# Patient Record
(demographics unavailable — no encounter records)

---

## 2024-10-18 NOTE — END OF VISIT
[FreeTextEntry3] :  All medical record entries made by my scribe were at my, Dr. Lester Arellano, direction and personally dictated by me. I have reviewed the chart and agree that the record accurately reflects my personal performance of the history, physical exam, assessment and plan. I have also personally directed, reviewed, and agreed with the chart.

## 2024-10-18 NOTE — PROCEDURE
[No] : not [NSR] : normal sinus rhythm [See Device Printout] : See device printout [Longevity: ___ months] : The estimated remaining battery life is [unfilled] months [Sensing Amplitude ___mv] : sensing amplitude was [unfilled] mv [None] : none [Programmed for Longevity] : output reprogrammed for improved battery longevity [de-identified] : 58bpm [de-identified] : Medtronic [de-identified] : LINQ 2 [de-identified] : [id number] [de-identified] : 5/3/2022 [de-identified] : 68 monitored AF episodes - longest lasting 14 hours on 08/26/24 AF burden - 7% On remote

## 2024-10-18 NOTE — PHYSICAL EXAM
[General Appearance - Well Developed] : well developed [Normal Appearance] : normal appearance [Well Groomed] : well groomed [General Appearance - Well Nourished] : well nourished [No Deformities] : no deformities [General Appearance - In No Acute Distress] : no acute distress [Heart Rate And Rhythm] : heart rate and rhythm were normal [Heart Sounds] : normal S1 and S2 [Murmurs] : no murmurs present [Exaggerated Use Of Accessory Muscles For Inspiration] : no accessory muscle use [Respiration, Rhythm And Depth] : normal respiratory rhythm and effort [Auscultation Breath Sounds / Voice Sounds] : lungs were clear to auscultation bilaterally [Clean] : clean [Dry] : dry [Well-Healed] : well-healed [Abdomen Soft] : soft [Abdomen Tenderness] : non-tender [Abdomen Mass (___ Cm)] : no abdominal mass palpated [Nail Clubbing] : no clubbing of the fingernails [Cyanosis, Localized] : no localized cyanosis [Petechial Hemorrhages (___cm)] : no petechial hemorrhages [] : no ischemic changes [Well Developed] : well developed [Well Nourished] : well nourished [No Acute Distress] : no acute distress [Normal Conjunctiva] : normal conjunctiva [Normal Venous Pressure] : normal venous pressure [No Carotid Bruit] : no carotid bruit [Normal S1, S2] : normal S1, S2 [No Murmur] : no murmur [No Rub] : no rub [No Gallop] : no gallop [Clear Lung Fields] : clear lung fields [Good Air Entry] : good air entry [No Respiratory Distress] : no respiratory distress  [Soft] : abdomen soft [Non Tender] : non-tender [No Masses/organomegaly] : no masses/organomegaly [Normal Bowel Sounds] : normal bowel sounds [Normal Gait] : normal gait [No Edema] : no edema [No Cyanosis] : no cyanosis [No Clubbing] : no clubbing [No Varicosities] : no varicosities [No Rash] : no rash [No Skin Lesions] : no skin lesions [Moves all extremities] : moves all extremities [No Focal Deficits] : no focal deficits [Normal Speech] : normal speech [Alert and Oriented] : alert and oriented [Normal memory] : normal memory

## 2024-10-18 NOTE — ADDENDUM
[FreeTextEntry1] : Yuridia NUGENT assisted in documentation on 10/18/2024   acting as a scribe for Dr. Lester Arellano.

## 2024-10-18 NOTE — ASSESSMENT
[FreeTextEntry1] : AFib.   - Patient had appendage surgically ligated and confirmed by CT scan. Patient is no longer on Eliquis.   - Continue Aspirin 81 mg once a day.   - Continue Metoprolol 25 mg once a day.    - Patient's AF burden is slightly increasing.    - I discussed with patient the possibility of ablation if AF burden continues to rise.  - Will continue to monitor.    Hypertension - Patient's pressure is elevated.    - Add Amlodipine 5 mg QD.   - Continue Losartan 25 mg daily. - Continue Metoprolol 25 mg Q12.

## 2025-04-22 NOTE — PHYSICAL EXAM
[General Appearance - Well Developed] : well developed [Normal Appearance] : normal appearance [Well Groomed] : well groomed [General Appearance - Well Nourished] : well nourished [No Deformities] : no deformities [General Appearance - In No Acute Distress] : no acute distress [Heart Rate And Rhythm] : heart rate and rhythm were normal [Heart Sounds] : normal S1 and S2 [Murmurs] : no murmurs present [Respiration, Rhythm And Depth] : normal respiratory rhythm and effort [Exaggerated Use Of Accessory Muscles For Inspiration] : no accessory muscle use [Auscultation Breath Sounds / Voice Sounds] : lungs were clear to auscultation bilaterally [Clean] : clean [Dry] : dry [Well-Healed] : well-healed [Abdomen Soft] : soft [Abdomen Tenderness] : non-tender [Abdomen Mass (___ Cm)] : no abdominal mass palpated [Nail Clubbing] : no clubbing of the fingernails [Cyanosis, Localized] : no localized cyanosis [Petechial Hemorrhages (___cm)] : no petechial hemorrhages [] : no ischemic changes [Well Developed] : well developed [Well Nourished] : well nourished [No Acute Distress] : no acute distress [Normal Conjunctiva] : normal conjunctiva [Normal Venous Pressure] : normal venous pressure [No Carotid Bruit] : no carotid bruit [Normal S1, S2] : normal S1, S2 [No Murmur] : no murmur [No Rub] : no rub [No Gallop] : no gallop [Clear Lung Fields] : clear lung fields [Good Air Entry] : good air entry [No Respiratory Distress] : no respiratory distress  [Soft] : abdomen soft [Non Tender] : non-tender [No Masses/organomegaly] : no masses/organomegaly [Normal Bowel Sounds] : normal bowel sounds [Normal Gait] : normal gait [No Edema] : no edema [No Cyanosis] : no cyanosis [No Clubbing] : no clubbing [No Varicosities] : no varicosities [No Rash] : no rash [No Skin Lesions] : no skin lesions [Moves all extremities] : moves all extremities [No Focal Deficits] : no focal deficits [Normal Speech] : normal speech [Alert and Oriented] : alert and oriented [Normal memory] : normal memory

## 2025-05-03 NOTE — ASSESSMENT
[FreeTextEntry1] : AFib.   - Patient had appendage surgically ligated and confirmed by CT scan. Patient is no longer on Eliquis.   - Continue Aspirin 81 mg once a day.   - Continue Metoprolol 25 mg once a day.    - Patient has a low AF burden and feels well.   - Recommend continuing to monitor patient.     - Discussed the possibility of ablation in the future is symptoms change.    Hypertension - Patient's pressure is elevated.    - Add Amlodipine 5 mg QD.   - Continue Losartan 25 mg daily. - Continue Metoprolol 25 mg Q12.

## 2025-05-03 NOTE — ADDENDUM
[FreeTextEntry1] : Yuridia NUGENT assisted in documentation on 04/22/2025   acting as a scribe for Dr. Lester Arellano.

## 2025-05-03 NOTE — PROCEDURE
[No] : not [NSR] : normal sinus rhythm [See Device Printout] : See device printout [Longevity: ___ months] : The estimated remaining battery life is [unfilled] months [Sensing Amplitude ___mv] : sensing amplitude was [unfilled] mv [None] : none [Programmed for Longevity] : output reprogrammed for improved battery longevity [de-identified] : [employer]tronic [de-identified] : LINQ 2 [de-identified] : [id number] [de-identified] : 5/3/2022 [de-identified] : 69 bpm [de-identified] : 5 AF episodes - longest lasting 27 hours AF burden - 1.1% On remote

## 2025-05-03 NOTE — HISTORY OF PRESENT ILLNESS
[FreeTextEntry1] : 79 y/o F PMH Bowel obstruction, HTN and sarcoma gastric, MV repair, TV repaired and MAZE, HOLLY ligation. CHADVASC 4Patient was doing well for 4 four yearts and now developed AF again.   Patient s/p AF ablation 9/6/22 cryo.  Patient feels great; no palpitations and no complaints after the ablation.  Patient is doing great, comes to the office for follow-up for CTA results of her ligated appendage.    Patient comes to the office for routine follow-up. Patient is back in atrial fibrillation. However, patient denies palpitations or shortness of breath. This is the first recurrence since ablation.    Patient comes to the office for routine follow-up. Patient was scheduled for LEXY and cardioversion in September. However, she converted to sinus rhythm on her own. Patient had no episodes of AFib on the loop recorder since September 6, 2023. Patient is also off Eliquis given surgical ligation of the appendage with CT confirming no patency.   10/18/2024: Patient comes to the office for follow-up. Patient has minimal palpitations at this time and comes for further follow-up. Her AF burden has increased to 7% since last visit.    04/22/2025: Patient comes to the office today for follow-up. Patient recently underwent GI surgery. Patient had 1% AF burden and does not feel any palpitations or shortness of breath. He states that he feels well overall.      EKG (04/22/2025): Sinus rhythm at 67 bpm. EKG (10/18/2024): Atrial fibrillation at 67 bpm.   EKG (11/16/2023): Atrial fibrillation at 67 bpm  CTA (05/2023): Ligated left atrial appendage with no evidence of residual leaks or thrombus.  echo 2/22 - EF 40-45%

## 2025-05-03 NOTE — PROCEDURE
[No] : not [NSR] : normal sinus rhythm [See Device Printout] : See device printout [Longevity: ___ months] : The estimated remaining battery life is [unfilled] months [Sensing Amplitude ___mv] : sensing amplitude was [unfilled] mv [None] : none [Programmed for Longevity] : output reprogrammed for improved battery longevity [de-identified] : [employer]tronic [de-identified] : LINQ 2 [de-identified] : [id number] [de-identified] : 5/3/2022 [de-identified] : 69 bpm [de-identified] : 5 AF episodes - longest lasting 27 hours AF burden - 1.1% On remote

## 2025-06-13 NOTE — PROCEDURE
[No] : not [NSR] : normal sinus rhythm [See Device Printout] : See device printout [Longevity: ___ months] : The estimated remaining battery life is [unfilled] months [Sensing Amplitude ___mv] : sensing amplitude was [unfilled] mv [None] : none [Programmed for Longevity] : output reprogrammed for improved battery longevity [de-identified] : [employer]tronic [de-identified] : LINQ 2 [de-identified] : [id number] [de-identified] : 5/3/2022 [de-identified] : 69 bpm [de-identified] : 5 AF episodes - longest lasting 27 hours AF burden - 1.1% On remote

## 2025-06-13 NOTE — ASSESSMENT
[FreeTextEntry1] : AFib.   - Patient had appendage surgically ligated and confirmed by CT scan. Patient is no longer on Eliquis.   - Continue Aspirin 81 mg once a day.   - Continue Metoprolol 25 mg once a day.        Hypertension - Patient's pressure is elevated.    - Add Amlodipine 5 mg QD.   - Continue Losartan 25 mg daily. - Continue Metoprolol 25 mg Q12.

## 2025-06-13 NOTE — PROCEDURE
[No] : not [NSR] : normal sinus rhythm [See Device Printout] : See device printout [Longevity: ___ months] : The estimated remaining battery life is [unfilled] months [Sensing Amplitude ___mv] : sensing amplitude was [unfilled] mv [None] : none [Programmed for Longevity] : output reprogrammed for improved battery longevity [de-identified] : [employer]onic [de-identified] : LINQ 2 [de-identified] : [id number] [de-identified] : 5/3/2022 [de-identified] : 69 bpm [de-identified] : 5 AF episodes - longest lasting 27 hours AF burden - 1.1% On remote